# Patient Record
Sex: MALE | Race: WHITE | NOT HISPANIC OR LATINO
[De-identification: names, ages, dates, MRNs, and addresses within clinical notes are randomized per-mention and may not be internally consistent; named-entity substitution may affect disease eponyms.]

---

## 2018-03-19 ENCOUNTER — TRANSCRIPTION ENCOUNTER (OUTPATIENT)
Age: 38
End: 2018-03-19

## 2022-03-10 ENCOUNTER — OUTPATIENT (OUTPATIENT)
Dept: OUTPATIENT SERVICES | Facility: HOSPITAL | Age: 42
LOS: 1 days | End: 2022-03-10

## 2022-03-10 DIAGNOSIS — Z01.00 ENCOUNTER FOR EXAMINATION OF EYES AND VISION WITHOUT ABNORMAL FINDINGS: ICD-10-CM

## 2023-12-14 ENCOUNTER — APPOINTMENT (OUTPATIENT)
Dept: CARDIOLOGY | Facility: CLINIC | Age: 43
End: 2023-12-14
Payer: COMMERCIAL

## 2023-12-14 ENCOUNTER — NON-APPOINTMENT (OUTPATIENT)
Age: 43
End: 2023-12-14

## 2023-12-14 VITALS — SYSTOLIC BLOOD PRESSURE: 136 MMHG | DIASTOLIC BLOOD PRESSURE: 84 MMHG

## 2023-12-14 VITALS
HEART RATE: 61 BPM | WEIGHT: 167 LBS | BODY MASS INDEX: 23.91 KG/M2 | OXYGEN SATURATION: 99 % | DIASTOLIC BLOOD PRESSURE: 82 MMHG | HEIGHT: 70 IN | SYSTOLIC BLOOD PRESSURE: 140 MMHG

## 2023-12-14 DIAGNOSIS — Z13.6 ENCOUNTER FOR SCREENING FOR CARDIOVASCULAR DISORDERS: ICD-10-CM

## 2023-12-14 DIAGNOSIS — Z78.9 OTHER SPECIFIED HEALTH STATUS: ICD-10-CM

## 2023-12-14 DIAGNOSIS — Z82.49 FAMILY HISTORY OF ISCHEMIC HEART DISEASE AND OTHER DISEASES OF THE CIRCULATORY SYSTEM: ICD-10-CM

## 2023-12-14 DIAGNOSIS — Z81.1 FAMILY HISTORY OF ALCOHOL ABUSE AND DEPENDENCE: ICD-10-CM

## 2023-12-14 DIAGNOSIS — Z87.891 PERSONAL HISTORY OF NICOTINE DEPENDENCE: ICD-10-CM

## 2023-12-14 PROBLEM — Z00.00 ENCOUNTER FOR PREVENTIVE HEALTH EXAMINATION: Status: ACTIVE | Noted: 2023-12-14

## 2023-12-14 PROCEDURE — 93000 ELECTROCARDIOGRAM COMPLETE: CPT

## 2023-12-14 PROCEDURE — 99204 OFFICE O/P NEW MOD 45 MIN: CPT | Mod: 25

## 2023-12-14 RX ORDER — LISINOPRIL AND HYDROCHLOROTHIAZIDE TABLETS 20; 12.5 MG/1; MG/1
20-12.5 TABLET ORAL DAILY
Refills: 0 | Status: ACTIVE | COMMUNITY
Start: 2023-12-14

## 2023-12-14 RX ORDER — ROSUVASTATIN CALCIUM 40 MG/1
40 TABLET, FILM COATED ORAL
Qty: 90 | Refills: 0 | Status: ACTIVE | COMMUNITY
Start: 2023-12-14

## 2023-12-14 NOTE — HISTORY OF PRESENT ILLNESS
[FreeTextEntry1] : 43 year old male, former smoker, HTN, HLD, presents for a cardiac evaluation. Patient has strong family history of early CAD (father with MI at age 43 years old), so patient would like to be evaluated. In the past, patient able to run 3 miles almost daily, however, now feels less able to do so. Possibly mild dyspnea on exertion. No chest pain or pressure.  There is no history of MI, CVA, CHF, or previous coronary intervention.

## 2023-12-14 NOTE — PHYSICAL EXAM
[Normal] : moves all extremities, no focal deficits, normal speech [de-identified] :  No carotid bruits auscultated bilaterally.

## 2023-12-14 NOTE — REASON FOR VISIT
[Other: ____] : [unfilled] [FreeTextEntry3] : Amrik Bolivar [General Appearance - Well Developed] : well developed [General Appearance - Well Nourished] : well nourished [Abdomen Soft] : soft [Normal Appearance] : normal appearance [Abdomen Tenderness] : non-tender [Skin Color & Pigmentation] : normal skin color and pigmentation [] : no respiratory distress [Oriented To Time, Place, And Person] : oriented to person, place, and time [Exaggerated Use Of Accessory Muscles For Inspiration] : no accessory muscle use [Normal Station and Gait] : the gait and station were normal for the patient's age [Not Anxious] : not anxious

## 2023-12-14 NOTE — DISCUSSION/SUMMARY
[FreeTextEntry1] : 1. HTN: Goal BP less than 130/80. Recommend low salt diet. Continue lisinopril/HCTZ 20/12.5mg daily.  2. HLD: continue rosuvastatin 40mg daily.  3. Dyspnea: given risk factors and family history of early CAD (father with MI at age 43 years old), advising CTA of the coronary arteries.  Office will call with results.   Follow up in 1 year. [EKG obtained to assist in diagnosis and management of assessed problem(s)] : EKG obtained to assist in diagnosis and management of assessed problem(s)

## 2024-01-24 ENCOUNTER — NON-APPOINTMENT (OUTPATIENT)
Age: 44
End: 2024-01-24

## 2024-01-26 ENCOUNTER — APPOINTMENT (OUTPATIENT)
Dept: CARDIOLOGY | Facility: CLINIC | Age: 44
End: 2024-01-26
Payer: COMMERCIAL

## 2024-01-26 ENCOUNTER — TRANSCRIPTION ENCOUNTER (OUTPATIENT)
Age: 44
End: 2024-01-26

## 2024-01-26 PROCEDURE — 93306 TTE W/DOPPLER COMPLETE: CPT

## 2024-01-26 PROCEDURE — 93880 EXTRACRANIAL BILAT STUDY: CPT

## 2024-01-29 ENCOUNTER — APPOINTMENT (OUTPATIENT)
Dept: CARDIOLOGY | Facility: CLINIC | Age: 44
End: 2024-01-29
Payer: COMMERCIAL

## 2024-01-29 DIAGNOSIS — R06.09 OTHER FORMS OF DYSPNEA: ICD-10-CM

## 2024-01-29 PROCEDURE — 93015 CV STRESS TEST SUPVJ I&R: CPT

## 2024-02-27 ENCOUNTER — NON-APPOINTMENT (OUTPATIENT)
Age: 44
End: 2024-02-27

## 2024-02-28 ENCOUNTER — RESULT REVIEW (OUTPATIENT)
Age: 44
End: 2024-02-28

## 2024-03-01 ENCOUNTER — APPOINTMENT (OUTPATIENT)
Dept: CARDIOLOGY | Facility: CLINIC | Age: 44
End: 2024-03-01
Payer: COMMERCIAL

## 2024-03-01 VITALS
OXYGEN SATURATION: 98 % | SYSTOLIC BLOOD PRESSURE: 128 MMHG | WEIGHT: 170 LBS | BODY MASS INDEX: 24.34 KG/M2 | HEIGHT: 70 IN | HEART RATE: 92 BPM | DIASTOLIC BLOOD PRESSURE: 70 MMHG

## 2024-03-01 PROCEDURE — 99215 OFFICE O/P EST HI 40 MIN: CPT

## 2024-03-01 PROCEDURE — G2211 COMPLEX E/M VISIT ADD ON: CPT | Mod: NC,1L

## 2024-03-01 RX ORDER — ASPIRIN ENTERIC COATED TABLETS 81 MG 81 MG/1
81 TABLET, DELAYED RELEASE ORAL
Refills: 0 | Status: ACTIVE | COMMUNITY
Start: 2024-03-01

## 2024-03-01 NOTE — DISCUSSION/SUMMARY
[FreeTextEntry1] : 1. HTN: Goal BP less than 130/80. Recommend low salt diet. Continue lisinopril/HCTZ 20/12.5mg daily.  2. HLD: continue rosuvastatin 40mg daily. Goal LDL is less than 50 in this high risk patient. If not at goal after cardiac catheterization will add PCKS-9 inhibitor.  3. CAD: reviewed findings of CTA with patient from 2/27/2024. He will not exercise anymore until cardiac catheterization. He will start taking Aspirin 81mg daily and continue rosuvastatin 40mg daily.  Goal LDL is less than 50 in this high risk patient. If not at goal after cardiac catheterization will add PCKS-9 inhibitor.  Follow up after cardiac catheterization.

## 2024-03-01 NOTE — PHYSICAL EXAM
[Normal] : moves all extremities, no focal deficits, normal speech [de-identified] :  No carotid bruits auscultated bilaterally.

## 2024-03-01 NOTE — CARDIOLOGY SUMMARY
[de-identified] : 12/14/2023, NSR, normal ECG [de-identified] : 1/29/2024, Plain Treadmill Stress Test: Exercised for 12 minutes utilizing Standard Gordon Protocol. ECG changes suggestive of ischemia [de-identified] : 2/27/2024, CTA of Coronary Arteries: High grade stenosis mid-distal RCA (FFR 0.59), proximal LAD with moderate narrowing, 1st diagonal with mild ddisease, proximal Cx with moderate stenosis, Right Dominant system [de-identified] : 1/26/2024, LV EF 60-65%, normal LV diastolic function, trace-mild MR, trace AI, mild TR with estimated PASP of 35mmHg.

## 2024-03-04 RX ORDER — CYCLOBENZAPRINE HYDROCHLORIDE 10 MG/1
10 TABLET, FILM COATED ORAL
Qty: 7 | Refills: 0 | Status: ACTIVE | COMMUNITY
Start: 2024-02-17

## 2024-03-06 LAB
ALBUMIN SERPL ELPH-MCNC: 4.7 G/DL
ALP BLD-CCNC: 50 U/L
ALT SERPL-CCNC: 22 U/L
ANION GAP SERPL CALC-SCNC: 12 MMOL/L
AST SERPL-CCNC: 21 U/L
BASOPHILS # BLD AUTO: 0.04 K/UL
BASOPHILS NFR BLD AUTO: 0.8 %
BILIRUB SERPL-MCNC: 0.2 MG/DL
BUN SERPL-MCNC: 15 MG/DL
CALCIUM SERPL-MCNC: 10.1 MG/DL
CHLORIDE SERPL-SCNC: 100 MMOL/L
CHOLEST SERPL-MCNC: 145 MG/DL
CO2 SERPL-SCNC: 25 MMOL/L
CREAT SERPL-MCNC: 0.97 MG/DL
EGFR: 98 ML/MIN/1.73M2
EOSINOPHIL # BLD AUTO: 0.07 K/UL
EOSINOPHIL NFR BLD AUTO: 1.4 %
GLUCOSE SERPL-MCNC: 89 MG/DL
HCT VFR BLD CALC: 44 %
HDLC SERPL-MCNC: 55 MG/DL
HGB BLD-MCNC: 14.3 G/DL
IMM GRANULOCYTES NFR BLD AUTO: 0.2 %
LDLC SERPL CALC-MCNC: 79 MG/DL
LYMPHOCYTES # BLD AUTO: 1.8 K/UL
LYMPHOCYTES NFR BLD AUTO: 34.8 %
MAN DIFF?: NORMAL
MCHC RBC-ENTMCNC: 30.2 PG
MCHC RBC-ENTMCNC: 32.5 GM/DL
MCV RBC AUTO: 93 FL
MONOCYTES # BLD AUTO: 0.51 K/UL
MONOCYTES NFR BLD AUTO: 9.9 %
NEUTROPHILS # BLD AUTO: 2.74 K/UL
NEUTROPHILS NFR BLD AUTO: 52.9 %
NONHDLC SERPL-MCNC: 90 MG/DL
PLATELET # BLD AUTO: 258 K/UL
POTASSIUM SERPL-SCNC: 4.6 MMOL/L
PROT SERPL-MCNC: 7.7 G/DL
RBC # BLD: 4.73 M/UL
RBC # FLD: 12.2 %
SODIUM SERPL-SCNC: 137 MMOL/L
TRIGL SERPL-MCNC: 50 MG/DL
WBC # FLD AUTO: 5.17 K/UL

## 2024-03-07 ENCOUNTER — RESULT REVIEW (OUTPATIENT)
Age: 44
End: 2024-03-07

## 2024-03-14 ENCOUNTER — APPOINTMENT (OUTPATIENT)
Dept: CARDIOLOGY | Facility: CLINIC | Age: 44
End: 2024-03-14
Payer: COMMERCIAL

## 2024-03-14 VITALS
OXYGEN SATURATION: 98 % | BODY MASS INDEX: 24.39 KG/M2 | HEART RATE: 83 BPM | DIASTOLIC BLOOD PRESSURE: 54 MMHG | WEIGHT: 170 LBS | SYSTOLIC BLOOD PRESSURE: 100 MMHG

## 2024-03-14 DIAGNOSIS — I51.7 CARDIOMEGALY: ICD-10-CM

## 2024-03-14 DIAGNOSIS — R94.39 ABNORMAL RESULT OF OTHER CARDIOVASCULAR FUNCTION STUDY: ICD-10-CM

## 2024-03-14 PROCEDURE — G2211 COMPLEX E/M VISIT ADD ON: CPT | Mod: NC,1L

## 2024-03-14 PROCEDURE — 99215 OFFICE O/P EST HI 40 MIN: CPT

## 2024-03-15 PROBLEM — R94.39 ABNORMAL STRESS TEST: Status: RESOLVED | Noted: 2024-03-15 | Resolved: 2024-03-15

## 2024-03-15 PROBLEM — R94.39 ABNORMAL STRESS ECG WITH TREADMILL: Status: ACTIVE | Noted: 2024-01-29

## 2024-03-15 RX ORDER — EVOLOCUMAB 140 MG/ML
140 INJECTION, SOLUTION SUBCUTANEOUS
Qty: 3 | Refills: 1 | Status: ACTIVE | COMMUNITY
Start: 2024-03-06

## 2024-03-16 PROBLEM — I51.7 RIGHT HEART ENLARGEMENT: Status: ACTIVE | Noted: 2024-03-16

## 2024-03-16 RX ORDER — CLOPIDOGREL 75 MG/1
75 TABLET, FILM COATED ORAL
Refills: 0 | Status: ACTIVE | COMMUNITY

## 2024-03-16 NOTE — CARDIOLOGY SUMMARY
[de-identified] : 03/16/24 TTE: LVEF 60-65%. Normal LV diastolic function. PASP 16. RA dilation and mild RV enlargement?  [de-identified] : PCI 03/06/24: Overlapping IVUS-guided AAKASH x 3 to prox-distal RCA (3.0 x 38 mm, 3.0 x 38 mm, 3.0 x 12 mm) Proximally post-dilated to 3.25 mm. Mild-moderate (non-obstructive) stenoses of the LAD / LCx. LVEDP 12

## 2024-03-16 NOTE — HISTORY OF PRESENT ILLNESS
[FreeTextEntry1] : 43 M with strong family history of premature CAD and hyperliipidemia, found to have significantly abnormal coronary CTA w/ FFR positive lesion in RCA s/p PCI with AAKASH x 3 of prox-distal RCA on 03/06/24. He is doing well post-procedurally and has no sensorimotor complaints re RRA access site. No bruising, swelling, tenderness. Negative cardiovascular symptom review. Feeling well overall and discussed next steps, including my thoughts on lipid lowering regimen (though ultimately at discretion of Dr. Tillman, patient's primary cardiologist).

## 2024-03-16 NOTE — DISCUSSION/SUMMARY
[FreeTextEntry1] :  43 M with strong family history of CAD and ischemic symptoms (decreased exercise tolerance) in setting obstructive prox-mid RCA disease s/p PCI AAKASH x 3 with excellent angiographic result feeling well on post-cath check in terms of cardiovascular ROS and RRA site check.   His SUNITA / RVE must be investigated further -- given his normal PASP/RVSP, and absence of pulmonic stenosis, suspect a l L-->R shunt (e.g., ASD). TTE with agitated saline would be an excellent start; however, if no ASD is identified, more advanced imaging +/- RHC w/ shunt run is indicated.   Plan:   - Continue crestor 40 mg daily + start repatha once preauthorization clears (better mortality benefit per FOURIER trial). - ASA 81 mg daily x 6 months at least.  - Clopidogrel 75 mg daily indefinitely  - Workup for right heart enlargement deferred to Dr. Tillman's upcoming visit, but happy to assist however needed.  - Strict return precautions and contact info provided should patient have any issue obtaining his meds, especially DAPT agents.  - Ongoing aggressive risk factor modification; congratulated on significant weight loss (even if secondary to loss of taste from COVID infection)   Appreciate the opportunity to participate in the care of Mr. HERNANDEZ. Strict ER precautions were provided to patient for symptoms that arise or worsen. Please do not hesitate to reach out with any questions, concerns, or changes in clinical status.   Berto Velazco MD, [facility]  Interventional & Clinical Cardiology

## 2024-04-02 ENCOUNTER — APPOINTMENT (OUTPATIENT)
Dept: CARDIOLOGY | Facility: CLINIC | Age: 44
End: 2024-04-02
Payer: COMMERCIAL

## 2024-04-02 VITALS
DIASTOLIC BLOOD PRESSURE: 68 MMHG | HEIGHT: 70 IN | OXYGEN SATURATION: 98 % | HEART RATE: 65 BPM | WEIGHT: 172 LBS | SYSTOLIC BLOOD PRESSURE: 100 MMHG | BODY MASS INDEX: 24.62 KG/M2

## 2024-04-02 DIAGNOSIS — I10 ESSENTIAL (PRIMARY) HYPERTENSION: ICD-10-CM

## 2024-04-02 DIAGNOSIS — Z95.5 PRESENCE OF CORONARY ANGIOPLASTY IMPLANT AND GRAFT: ICD-10-CM

## 2024-04-02 DIAGNOSIS — I25.10 ATHEROSCLEROTIC HEART DISEASE OF NATIVE CORONARY ARTERY W/OUT ANGINA PECTORIS: ICD-10-CM

## 2024-04-02 DIAGNOSIS — E78.5 HYPERLIPIDEMIA, UNSPECIFIED: ICD-10-CM

## 2024-04-02 DIAGNOSIS — Z79.899 OTHER LONG TERM (CURRENT) DRUG THERAPY: ICD-10-CM

## 2024-04-02 PROCEDURE — 99215 OFFICE O/P EST HI 40 MIN: CPT

## 2024-04-02 PROCEDURE — G2211 COMPLEX E/M VISIT ADD ON: CPT | Mod: NC,1L

## 2024-04-02 RX ORDER — METOPROLOL SUCCINATE 25 MG/1
25 TABLET, EXTENDED RELEASE ORAL DAILY
Qty: 90 | Refills: 3 | Status: ACTIVE | COMMUNITY
Start: 1900-01-01 | End: 1900-01-01

## 2024-04-02 NOTE — CARDIOLOGY SUMMARY
[de-identified] : 12/14/2023, NSR, normal ECG [de-identified] : 1/29/2024, Plain Treadmill Stress Test: Exercised for 12 minutes utilizing Standard Gordon Protocol. ECG changes suggestive of ischemia [de-identified] : 2/27/2024, CTA of Coronary Arteries: High grade stenosis mid-distal RCA (FFR 0.59), proximal LAD with moderate narrowing, 1st diagonal with mild ddisease, proximal Cx with moderate stenosis, Right Dominant system [de-identified] : 3/7/2024, LV EF 60-65% 1/26/2024, LV EF 60-65%, normal LV diastolic function, trace-mild MR, trace AI, mild TR with estimated PASP of 35m. [de-identified] : 3/6/2024, Overlapping IVUS-guided AAKASH x 3 to prox-distal RCA (3.0 x 38 mm, 3.0 x 38 mm, 3.0 x 12 mm) Proximally post-dilated to 3.25 mm. Mild-moderate (non-obstructive) stenoses of the LAD / LCx. LVEDP 12

## 2024-04-02 NOTE — HISTORY OF PRESENT ILLNESS
[FreeTextEntry1] : Historical Perspective: 43 year old male, former smoker, HTN, HLD, presents for a cardiac evaluation. Patient has strong family history of early CAD (father with MI at age 43 years old), so patient would like to be evaluated. In the past, patient able to run 3 miles almost daily, however, now feels less able to do so. Possibly mild dyspnea on exertion. No chest pain or pressure.  There is no history of MI, CVA, CHF, or previous coronary intervention.  Current Health Status: Patient had cardiac catheterization done, 3/6/2024. He underwent PCI, overlapping IVUS-guided AAKAHS x 3 to prox-distal RCA (3.0 x 38 mm, 3.0 x 38 mm, 3.0 x 12 mm) Proximally post-dilated to 3.25 mm. Mild-moderate (non-obstructive) stenoses of the LAD / LCx. LVEDP 12. Compliant with medical therapy and reports no adverse effects.

## 2024-04-02 NOTE — PHYSICAL EXAM
[de-identified] :  No carotid bruits auscultated bilaterally. [Normal] : moves all extremities, no focal deficits, normal speech

## 2024-04-02 NOTE — DISCUSSION/SUMMARY
[FreeTextEntry1] : 1. HTN: Goal BP less than 130/80. Recommend low salt diet. Continue lisinopril/HCTZ 20/12.5mg daily, Toprol XL 25mg daily (high risk medication with no signs of toxicity).  2. HLD: continue rosuvastatin 40mg daily and Repatha. Goal LDL is less than 50.  3. CAD: reviewed findings of CTA with patient from 2/27/2024. Patient had cardiac catheterization done, 3/6/2024. He underwent PCI, overlapping IVUS-guided AAKASH x 3 to prox-distal RCA (3.0 x 38 mm, 3.0 x 38 mm, 3.0 x 12 mm) Proximally post-dilated to 3.25 mm. Mild-moderate (non-obstructive) stenoses of the LAD / LCx. Continue Aspirin 81mg daily, Plavix 75mg daily, rosuvastatin 40mg daily and Repatha. Goal LDL less than 50.   Follow up in 6 months.

## 2024-10-03 ENCOUNTER — APPOINTMENT (OUTPATIENT)
Dept: CARDIOLOGY | Facility: CLINIC | Age: 44
End: 2024-10-03

## 2024-12-13 ENCOUNTER — APPOINTMENT (OUTPATIENT)
Dept: CARDIOLOGY | Facility: CLINIC | Age: 44
End: 2024-12-13

## 2025-01-11 ENCOUNTER — NON-APPOINTMENT (OUTPATIENT)
Age: 45
End: 2025-01-11

## 2025-04-30 ENCOUNTER — NON-APPOINTMENT (OUTPATIENT)
Age: 45
End: 2025-04-30

## 2025-05-01 ENCOUNTER — NON-APPOINTMENT (OUTPATIENT)
Age: 45
End: 2025-05-01

## 2025-05-01 ENCOUNTER — APPOINTMENT (OUTPATIENT)
Dept: CARDIOLOGY | Facility: CLINIC | Age: 45
End: 2025-05-01
Payer: COMMERCIAL

## 2025-05-01 VITALS
HEIGHT: 70 IN | WEIGHT: 175 LBS | BODY MASS INDEX: 25.05 KG/M2 | OXYGEN SATURATION: 98 % | SYSTOLIC BLOOD PRESSURE: 130 MMHG | HEART RATE: 74 BPM | DIASTOLIC BLOOD PRESSURE: 72 MMHG

## 2025-05-01 DIAGNOSIS — I10 ESSENTIAL (PRIMARY) HYPERTENSION: ICD-10-CM

## 2025-05-01 DIAGNOSIS — Z95.5 PRESENCE OF CORONARY ANGIOPLASTY IMPLANT AND GRAFT: ICD-10-CM

## 2025-05-01 DIAGNOSIS — I25.10 ATHEROSCLEROTIC HEART DISEASE OF NATIVE CORONARY ARTERY W/OUT ANGINA PECTORIS: ICD-10-CM

## 2025-05-01 DIAGNOSIS — E78.5 HYPERLIPIDEMIA, UNSPECIFIED: ICD-10-CM

## 2025-05-01 PROCEDURE — G2211 COMPLEX E/M VISIT ADD ON: CPT | Mod: NC

## 2025-05-01 PROCEDURE — 93000 ELECTROCARDIOGRAM COMPLETE: CPT

## 2025-05-01 PROCEDURE — 99214 OFFICE O/P EST MOD 30 MIN: CPT
